# Patient Record
Sex: MALE | Race: WHITE
[De-identification: names, ages, dates, MRNs, and addresses within clinical notes are randomized per-mention and may not be internally consistent; named-entity substitution may affect disease eponyms.]

---

## 2017-03-03 ENCOUNTER — HOSPITAL ENCOUNTER (OUTPATIENT)
Dept: HOSPITAL 80 - BMCIMAGING | Age: 72
End: 2017-03-03
Attending: INTERNAL MEDICINE
Payer: COMMERCIAL

## 2017-03-03 DIAGNOSIS — R07.89: Primary | ICD-10-CM

## 2018-01-23 ENCOUNTER — HOSPITAL ENCOUNTER (OUTPATIENT)
Dept: HOSPITAL 80 - FIMAGING | Age: 73
End: 2018-01-23
Attending: PHYSICAL MEDICINE & REHABILITATION
Payer: COMMERCIAL

## 2018-01-23 DIAGNOSIS — M51.36: ICD-10-CM

## 2018-01-23 DIAGNOSIS — M53.2X6: Primary | ICD-10-CM

## 2018-05-04 ENCOUNTER — HOSPITAL ENCOUNTER (OUTPATIENT)
Dept: HOSPITAL 80 - FIMAGING | Age: 73
End: 2018-05-04
Attending: PHYSICAL MEDICINE & REHABILITATION
Payer: COMMERCIAL

## 2018-05-04 DIAGNOSIS — M25.551: Primary | ICD-10-CM

## 2018-05-04 DIAGNOSIS — M51.36: ICD-10-CM

## 2018-05-14 ENCOUNTER — HOSPITAL ENCOUNTER (OUTPATIENT)
Dept: HOSPITAL 80 - FIMAGING | Age: 73
End: 2018-05-14
Attending: INTERNAL MEDICINE
Payer: COMMERCIAL

## 2018-05-14 DIAGNOSIS — K40.90: ICD-10-CM

## 2018-05-14 DIAGNOSIS — K80.20: Primary | ICD-10-CM

## 2018-05-14 DIAGNOSIS — K57.30: ICD-10-CM

## 2018-05-14 DIAGNOSIS — J98.4: ICD-10-CM

## 2018-05-14 DIAGNOSIS — I70.0: ICD-10-CM

## 2018-05-14 PROCEDURE — 74177 CT ABD & PELVIS W/CONTRAST: CPT

## 2018-06-22 ENCOUNTER — HOSPITAL ENCOUNTER (OUTPATIENT)
Dept: HOSPITAL 80 - CIMAGING | Age: 73
End: 2018-06-22
Attending: INTERNAL MEDICINE
Payer: COMMERCIAL

## 2018-06-22 DIAGNOSIS — K80.20: Primary | ICD-10-CM

## 2018-10-22 ENCOUNTER — HOSPITAL ENCOUNTER (OUTPATIENT)
Dept: HOSPITAL 80 - FIMAGING | Age: 73
End: 2018-10-22
Attending: SURGERY
Payer: COMMERCIAL

## 2018-10-22 DIAGNOSIS — K63.9: Primary | ICD-10-CM

## 2018-10-22 PROCEDURE — 74177 CT ABD & PELVIS W/CONTRAST: CPT

## 2018-10-29 ENCOUNTER — HOSPITAL ENCOUNTER (INPATIENT)
Dept: HOSPITAL 80 - F3N | Age: 73
LOS: 2 days | Discharge: HOME | DRG: 331 | End: 2018-10-31
Attending: SURGERY | Admitting: SURGERY
Payer: COMMERCIAL

## 2018-10-29 DIAGNOSIS — Z23: ICD-10-CM

## 2018-10-29 DIAGNOSIS — C18.9: Primary | ICD-10-CM

## 2018-10-29 DIAGNOSIS — E11.9: ICD-10-CM

## 2018-10-29 DIAGNOSIS — Z53.31: ICD-10-CM

## 2018-10-29 LAB — PLATELET # BLD: 276 10^3/UL (ref 150–400)

## 2018-10-29 PROCEDURE — 0DTL0ZZ RESECTION OF TRANSVERSE COLON, OPEN APPROACH: ICD-10-PCS | Performed by: SURGERY

## 2018-10-29 PROCEDURE — G0008 ADMIN INFLUENZA VIRUS VAC: HCPCS

## 2018-10-29 PROCEDURE — 8E0W0CZ ROBOTIC ASSISTED PROCEDURE OF TRUNK REGION, OPEN APPROACH: ICD-10-PCS | Performed by: SURGERY

## 2018-10-29 PROCEDURE — G0009 ADMIN PNEUMOCOCCAL VACCINE: HCPCS

## 2018-10-29 RX ADMIN — INSULIN GLARGINE SCH UNITS: 100 INJECTION, SOLUTION SUBCUTANEOUS at 20:41

## 2018-10-29 RX ADMIN — GATIFLOXACIN SCH: 5 SOLUTION/ DROPS OPHTHALMIC at 15:41

## 2018-10-29 RX ADMIN — CEFOXITIN SCH MLS: 1 INJECTION, POWDER, FOR SOLUTION INTRAVENOUS at 13:15

## 2018-10-29 RX ADMIN — GATIFLOXACIN SCH: 5 SOLUTION/ DROPS OPHTHALMIC at 21:59

## 2018-10-29 RX ADMIN — CEFOXITIN SCH MLS: 1 INJECTION, POWDER, FOR SOLUTION INTRAVENOUS at 17:21

## 2018-10-29 RX ADMIN — OXYCODONE HYDROCHLORIDE AND ACETAMINOPHEN PRN TAB: 5; 325 TABLET ORAL at 15:54

## 2018-10-29 RX ADMIN — CEFOXITIN ONE MLS: 2 INJECTION, POWDER, FOR SOLUTION INTRAVENOUS at 08:04

## 2018-10-29 RX ADMIN — CEFOXITIN ONE MLS: 2 INJECTION, POWDER, FOR SOLUTION INTRAVENOUS at 09:38

## 2018-10-29 RX ADMIN — PREDNISOLONE ACETATE SCH: 10 SUSPENSION/ DROPS OPHTHALMIC at 15:41

## 2018-10-29 RX ADMIN — OXYCODONE HYDROCHLORIDE AND ACETAMINOPHEN PRN TAB: 5; 325 TABLET ORAL at 21:56

## 2018-10-29 RX ADMIN — KETOROLAC TROMETHAMINE SCH: 5 SOLUTION OPHTHALMIC at 21:58

## 2018-10-29 RX ADMIN — KETOROLAC TROMETHAMINE SCH: 5 SOLUTION OPHTHALMIC at 15:41

## 2018-10-29 RX ADMIN — PREDNISOLONE ACETATE SCH: 10 SUSPENSION/ DROPS OPHTHALMIC at 21:58

## 2018-10-29 NOTE — POSTOPPROG
Post Op Note


Date of Operation: 10/29/18


Surgeon: Ross Pop


Assistant: Dr. Reis


Anesthesiologist: Dr. Verdugo


Anesthesia: GET(General Endotracheal)


Pre-op Diagnosis: Colon cancer


Post-op Diagnosis: same


Procedure: Robotic Saravanan, transverse colectomy


Inf/Abcess present in the surg proc area at time of surgery?: No


EBL:

## 2018-10-29 NOTE — PDMN
Medical Necessity


Medical necessity: 74 yo sp CPT 61011, lap partial colectomy, MCG S235 Bowel 

Surgery: Colectomy, Partial, with or without Ostomy, by Laparoscopy, MC IP only

## 2018-10-29 NOTE — PDANEPAE
ANE History of Present Illness





ascending colon resection, DaVinci assist





ANE Past Medical History





- Cardiovascular History


Hx Hypertension: No


Hx Arrhythmias: No


Hx Chest Pain: No


Hx Coronary Artery / Peripheral Vascular Disease: No


Hx CHF / Valvular Disease: No


Hx Palpitations: No


Cardiovascular History Comment: SINUS ROSANNA





- Pulmonary History


Hx COPD: No


Hx Asthma/Reactive Airway Disease: No


Hx Recent Upper Respiratory Infection: No


Hx Oxygen in Use at Home: No


Hx Sleep Apnea: No


Sleep Apnea Screening Result - Last Documented: Negative





- Neurologic History


Hx Cerebrovascular Accident: No


Hx Seizures: No


Hx Dementia: No





- Endocrine History


Hx Diabetes: Yes


Endocrine History Comment: IDDM SINCE 2005





- Renal History


Hx Renal Disorders: No





- Liver History


Hx Hepatic Disorders: Yes


Hepatic History Comment: GALL STONES





- Neurological & Psychiatric Hx


Hx Neurological and Psychiatric Disorders: No





- Cancer History


Hx Cancer: Yes


Cancer History Comment: NEW DX COLON





- Congenital Disorder History


Hx Congenital Disorders: No





- GI History


Hx Gastrointestinal Disorders: No


Gastrointestinal History Comment: INTERMITTENT ABD SWELLING





- Other Health History


Other Health History: RECENT CATARACT SURGERY.  SCOLIOSIS





- Chronic Pain History


Chronic Pain: Yes (ABD)





- Surgical History


Prior Surgeries: COLONOSCOPY 10/18.  TEJINDER CATARACTS.  LAMINECTOMY 2012.  

CERVICAL  2000.  APPENDECTOMY.  RT ING HERNIA





ANE Review of Systems


Review of systems is: negative


Review of Systems: 








- Exercise capacity


METS (RN): 4 METS





ANE Patient History





- Allergies


Allergies/Adverse Reactions: 








adhesive Allergy (Unknown, Verified 03/06/15 19:42)


 Unknown








- Home Medications


Home medications: home medication list seen and reviewed


Home Medications: 








Aspirin [Aspirin 81mg (*)] 81 mg PO DAILY 03/06/15 [Last Taken 10/23/18]


Atorvastatin Calcium [Lipitor 40 mg (*)] 40 mg PO DAILY 03/06/15 [Last Taken 10/

28/18]


Ibuprofen [Motrin (*)] 400 - 600 mg PO BID PRN 03/06/15 [Last Taken Unknown]


Insulin Glargine [Lantus 100 UNITS/ML] 31 units SC HS 03/06/15 [Last Taken 10/27

/18]


Metformin HCl [Metformin 1000 mg] 1,000 mg PO BIDMEAL 03/06/15 [Last Taken 10/28

/18]


Tears/Hypromellose [Natural Balance] 1 - 2 drops EACHEYE PRN PRN 03/06/15 [Last 

Taken 10/28/18]


Gatifloxacin 0.5% [Zymaxid 0.5%] 1 drop EACHEYE TID 10/19/18 [Last Taken 10/28/

18]


Ketorolac 0.5% [Acular 0.5% Opht Drops (*)] 1 drops EACHEYE QID 10/19/18 [Last 

Taken 10/28/18]


prednisoLONE ACET 1% [Pred Forte 1% (*)] 1 drops EACHEYE QID 10/19/18 [Last 

Taken 10/28/18]








- NPO status


NPO Status: no food or drink >8 hours


NPO Since - Liquids (Date): 10/28/18


NPO Since - Liquids (Time): 21:00


NPO Since - Solids (Date): 10/28/18


NPO Since - Solids (Time): 16:00





- Anes Hx


Anes Hx: no prior problems





- Smoking Hx


Smoking Status: Unknown if ever smoked





- Family Anes Hx


Family Anes Hx: none





ANE Labs/Vital Signs





- Vital Signs


Vital Signs: reviewed preoperatively; see RN documention for details


Blood Pressure: 126/76


Heart Rate: 55


Respiratory Rate: 16


O2 Sat (%): 94


Height: 190.5 cm


Weight: 90.718 kg





ANE Physical Exam





- Airway


Neck exam: FROM


Mallampati Score: Class 2


Mouth exam: normal dental/mouth exam





- Pulmonary


Pulmonary: no respiratory distress





- Cardiovascular


Cardiovascular: regular rate and rhythym





- ASA Status


ASA Status: II





ANE Anesthesia Plan


Anesthesia Plan: general endotracheal anesthesia

## 2018-10-30 LAB — PLATELET # BLD: 244 10^3/UL (ref 150–400)

## 2018-10-30 RX ADMIN — GATIFLOXACIN SCH DROP: 5 SOLUTION/ DROPS OPHTHALMIC at 17:59

## 2018-10-30 RX ADMIN — PREDNISOLONE ACETATE SCH DROP: 10 SUSPENSION/ DROPS OPHTHALMIC at 18:00

## 2018-10-30 RX ADMIN — CEFOXITIN SCH MLS: 1 INJECTION, POWDER, FOR SOLUTION INTRAVENOUS at 00:12

## 2018-10-30 RX ADMIN — KETOROLAC TROMETHAMINE SCH DROP: 5 SOLUTION OPHTHALMIC at 18:00

## 2018-10-30 RX ADMIN — CEFOXITIN SCH MLS: 1 INJECTION, POWDER, FOR SOLUTION INTRAVENOUS at 05:28

## 2018-10-30 RX ADMIN — GATIFLOXACIN SCH DROP: 5 SOLUTION/ DROPS OPHTHALMIC at 09:59

## 2018-10-30 RX ADMIN — KETOROLAC TROMETHAMINE SCH: 5 SOLUTION OPHTHALMIC at 05:51

## 2018-10-30 RX ADMIN — KETOROLAC TROMETHAMINE SCH MG: 15 INJECTION, SOLUTION INTRAMUSCULAR; INTRAVENOUS at 17:59

## 2018-10-30 RX ADMIN — KETOROLAC TROMETHAMINE SCH MG: 15 INJECTION, SOLUTION INTRAMUSCULAR; INTRAVENOUS at 23:27

## 2018-10-30 RX ADMIN — GATIFLOXACIN SCH DROP: 5 SOLUTION/ DROPS OPHTHALMIC at 16:05

## 2018-10-30 RX ADMIN — INSULIN GLARGINE SCH UNITS: 100 INJECTION, SOLUTION SUBCUTANEOUS at 20:08

## 2018-10-30 RX ADMIN — DOCUSATE SODIUM AND SENNOSIDES SCH TAB: 50; 8.6 TABLET ORAL at 20:09

## 2018-10-30 RX ADMIN — OXYCODONE HYDROCHLORIDE AND ACETAMINOPHEN PRN TAB: 5; 325 TABLET ORAL at 16:03

## 2018-10-30 RX ADMIN — PREDNISOLONE ACETATE SCH: 10 SUSPENSION/ DROPS OPHTHALMIC at 05:51

## 2018-10-30 RX ADMIN — POLYETHYLENE GLYCOL 3350 PRN GM: 17 POWDER, FOR SOLUTION ORAL at 13:40

## 2018-10-30 RX ADMIN — OXYCODONE HYDROCHLORIDE AND ACETAMINOPHEN PRN TAB: 5; 325 TABLET ORAL at 10:12

## 2018-10-30 RX ADMIN — KETOROLAC TROMETHAMINE SCH MG: 15 INJECTION, SOLUTION INTRAMUSCULAR; INTRAVENOUS at 12:30

## 2018-10-30 NOTE — ASMTCMCOM
CM Note

 

CM Note                       

Notes:

Pt is a 74 y/o man admitted for a scheduled surgery. Pt had a mass removed from his colon. Pt will 

most likely d/c independent when medically stable. No therapies ordered at this time. CM available 

for changes.







Plan: Independent w/ supportive wife

 

Date Signed:  10/30/2018 12:15 PM

Electronically Signed By:SULEIMAN Castorena

## 2018-10-30 NOTE — GOP
DATE OF OPERATION:  10/29/2018



SURGEON:  Vance Pop MD



ASSISTANT:  Glen Reis MD, whose presence was requested by me and medically necessary for the s
afe and timely completion of this case.



ANESTHESIA:  General endotracheal.



ANESTHESIOLOGIST:  Herber France MD



PREOPERATIVE DIAGNOSIS:  Colon cancer.



POSTOPERATIVE DIAGNOSIS:  Colon cancer with multiple adhesions.



PROCEDURE PERFORMED:  

1.  Laparoscopic lysis of adhesions.

2.  Laparoscopic splenic flexure mobilization.

3.  Laparoscopic transverse colectomy, robotic assisted.



FINDINGS:  Patient had multiple adhesions throughout the right lower quadrant and right upper quadran
t.  No abdominal hernia was identified.  No evidence of extracolonic cancer was identified.





ESTIMATED BLOOD LOSS:  50 cc.



INDICATIONS:  This is a 73-year-old male with a history of a mass on colonoscopy.  Biopsy returned ca
ncer.  Risks and benefits of the procedure were discussed with the patient and his family, their ques
tions were answered and they wished to proceed.



DESCRIPTION OF PROCEDURE:  Patient was in the supine position.  After induction of adequate general e
ndotracheal anesthesia, the patient was prepped and draped in a standard surgical fashion.  0.5% Yung
amanda was injected at the infraumbilical area for local anesthesia.  An 8 mm incision made infraumbili
edel, and the abdominal wall was elevated.  The Veress needle was inserted, and after noting proper 
pressures, the abdomen was insufflated with carbon dioxide.  An 8 mm trocar was passed.  The camera f
ollowed.  There was no apparent damage from trocar placement.  There were multiple adhesions, especia
lly in the right lower and right upper quadrants.  Two more 8 mm ports were placed in the lower abdom
en.  These were both placed under direct vision after injecting 0.5% Marcaine for local anesthesia. 



The extensive adhesions were lysed.  The abdomen was carefully evaluated and no defect was identified
.  The colon was inspected throughout its length.  A small amount of ink was identified in the center
 of the transverse colon.  The transverse colon was carefully mobilized.  There were again multiple a
dhesions in the right upper quadrant necessitating a prolonged dissection; however, the colon was ful
ly mobilized including the splenic flexure.  The decision was made to perform a mini laparotomy at [facility] point. 



The robot was undocked.  The colon was grasped with an atraumatic locking grasper.  A small midline l
aparotomy incision was made after injecting 0.5% Marcaine.  This was carried down the subcutaneous ti
ssues using Bovie cautery and blunt dissection.  The fascia was divided in the midline.  The abdomen 
was again entered.  A wound protector was placed.  The bowel delivered into the incision and palpated
.  The mass was easily identified directly adjacent to the tattooing.  No other lesions were noted.  
The sites for transection getting adequate margins were cleared off using blunt dissection.  The AMAN 
stapler was passed and fired in each case.  The mesentery was then serially clamped, divided, and lig
ated with 3-0 Vicryl ties down to the base of the mesentery.  The specimen was sent with a suture mar
nataly the proximal extent. 



The decision was made to perform a hand-sewn anastomosis.  A posterior row of 3-0 Vicryl sutures was 
placed in an interrupted fashion.  The ends were tagged.  The staple lines were then resected sharply
 and sent for permanent section.  Prep was excellent and the colon was viable.  Two separate sutures 
of 3-0 Vicryl were used to run the interior layer.  Once this was completed, the anterior completion 
layer of Lembert suture was placed using 3-0 Vicryl.  The lumen was palpated and widely patent.  The 
colon was viable.  No twisting was identified.  This was then dropped back in the abdomen and the are
a inspected.  It was thoroughly irrigated and aspirated.  The wound protector was removed and clean c
losure protocol was initiated. 



The fascia at the mini laparotomy site was closed with 0 PDS in a running fashion.  Wounds were thoro
ughly irrigated and the skin at all sites was closed with 4-0 Monocryl in a subcuticular stitch.  Wou
nds were dressed with Dermabond.  The patient was then extubated and taken to the PACU in stable cond
ition.



COMPLICATIONS:  None.



DRAINS:  None.





Job #:  [id number]

## 2018-10-31 VITALS — SYSTOLIC BLOOD PRESSURE: 125 MMHG | DIASTOLIC BLOOD PRESSURE: 71 MMHG

## 2018-10-31 RX ADMIN — GATIFLOXACIN SCH DROP: 5 SOLUTION/ DROPS OPHTHALMIC at 07:39

## 2018-10-31 RX ADMIN — OXYCODONE HYDROCHLORIDE AND ACETAMINOPHEN PRN TAB: 5; 325 TABLET ORAL at 02:15

## 2018-10-31 RX ADMIN — POLYETHYLENE GLYCOL 3350 PRN GM: 17 POWDER, FOR SOLUTION ORAL at 05:16

## 2018-10-31 RX ADMIN — OXYCODONE HYDROCHLORIDE AND ACETAMINOPHEN PRN TAB: 5; 325 TABLET ORAL at 09:08

## 2018-10-31 RX ADMIN — KETOROLAC TROMETHAMINE SCH DROP: 5 SOLUTION OPHTHALMIC at 07:40

## 2018-10-31 RX ADMIN — KETOROLAC TROMETHAMINE SCH MG: 15 INJECTION, SOLUTION INTRAMUSCULAR; INTRAVENOUS at 12:03

## 2018-10-31 RX ADMIN — KETOROLAC TROMETHAMINE SCH MG: 15 INJECTION, SOLUTION INTRAMUSCULAR; INTRAVENOUS at 05:14

## 2018-10-31 RX ADMIN — PREDNISOLONE ACETATE SCH DROP: 10 SUSPENSION/ DROPS OPHTHALMIC at 07:39

## 2018-10-31 RX ADMIN — DOCUSATE SODIUM AND SENNOSIDES SCH TAB: 50; 8.6 TABLET ORAL at 07:32

## 2018-10-31 NOTE — SOAPPROG
SOAP Progress Note


Assessment/Plan: 


Assessment:


s/p lap transverse colectomy, improving. Plan d/c today, discussed restrictions/

diet.  Questions answered.























Plan:





10/30/18 10:00





10/31/18 10:38





Subjective: 





Patient without complaints, mandi po.  + BM, no blood.  Ambulating, voiding.


Objective: 





 Vital Signs











Temp Pulse Resp BP Pulse Ox


 


 36.8 C   60   16   136/89 H  94 


 


 10/31/18 08:00  10/31/18 08:00  10/31/18 08:00  10/31/18 08:00  10/31/18 08:00








 Laboratory Results





 10/30/18 04:20 





 10/30/18 04:20 





 











 10/30/18 10/31/18 11/01/18





 05:59 05:59 05:59


 


Intake Total 1900 1800 


 


Output Total 2525 1200 150


 


Balance -625 600 -150








Alert, NAD


RRR


Abd soft, NTTP


Inc C/D/I





ICD10 Worksheet


Patient Problems: 


 Problems











Problem Status Onset


 


Syncope Acute

## 2019-04-22 ENCOUNTER — HOSPITAL ENCOUNTER (EMERGENCY)
Dept: HOSPITAL 80 - FED | Age: 74
Discharge: HOME | End: 2019-04-22
Payer: COMMERCIAL

## 2019-04-22 VITALS — SYSTOLIC BLOOD PRESSURE: 175 MMHG | DIASTOLIC BLOOD PRESSURE: 79 MMHG

## 2019-04-22 DIAGNOSIS — F41.9: Primary | ICD-10-CM

## 2019-04-22 DIAGNOSIS — Z79.4: ICD-10-CM

## 2019-04-22 DIAGNOSIS — E11.9: ICD-10-CM

## 2019-04-22 DIAGNOSIS — R10.11: ICD-10-CM

## 2019-04-22 NOTE — EDPHY
H & P


Stated Complaint: ruq abd  "swelling" intermittent over last 2 yrs has seen 

multiple docs


Time Seen by Provider: 04/22/19 07:18


HPI/ROS: 





CHIEF COMPLAINT:  Abdominal swelling





HISTORY OF PRESENT ILLNESS:  The patient is a 73-year-old man who complains of 

intermittent right upper quadrant and flank and rib swelling over the last 2 

years.  He states that he has seen multiple doctors for this and had multiple 

workups.  Most recently had a CT scan in October and had laparoscopic 

exploratory surgery in October for possible hernia.  No abnormalities were 

found.  He does have a history of colon cancer with partial colectomy as well 

as appendectomy.  He states that this morning he felt like his right upper 

quadrant was swollen compared to normal so he came to the emergency department 

to try and get a CT scan while it is still swollen.  No nausea vomiting.  No 

pain.  No diarrhea.  No fever.


Severity:  Moderate


Modifying factors:  None





REVIEW OF SYSTEMS:


Constitutional:  denies: chills, fever, recent illness, recent injury


EENTM: denies: blurred vision, double vision, nose congestion


Respiratory: denies: cough, shortness of breath


Cardiac: denies: chest pain, irregular heart rate, lightheadedness, palpitations


Gastrointestinal/Abdominal:  See HPI


Genitourinary: denies: dysuria, frequency, hematuria, pain


Musculoskeletal: denies: joint pain, muscle pain


Skin: denies: lesions, rash, jaundice, bruising


Neurological: denies: headache, numbness, paresthesia, tingling, dizziness, 

weakness


Hematologic/Lymphatic: denies: blood clots, easy bleeding, easy bruising


Immunologic/allergic: denies: HIV/AIDS, transplant


 10 systems reviewed and negative except as noted





EXAM:


GENERAL:  Well-appearing, well-nourished and in no acute distress.


HEAD:  Atraumatic, normocephalic.


EYES:  Pupils equal round and reactive to light, extraocular movements intact, 

sclera anicteric, conjunctiva are normal.


ENT:  TMs normal, nares patent, oropharynx clear without exudates.  Moist 

mucous membranes.


NECK:  Normal range of motion, supple without lymphadenopathy or JVD.


LUNGS:  Breath sounds clear to auscultation bilaterally and equal.  No wheezes 

rales or rhonchi.


HEART:  Regular rate and rhythm without murmurs, rubs or gallops.


ABDOMEN:  Soft, nontender, normoactive bowel sounds.  No guarding, no rebound.  

No masses appreciated.  No visible swelling or hernia.


BACK:  No CVA tenderness, no spinal tenderness, step-offs or deformities


EXTREMITIES:  Normal range of motion, no pitting or edema.  No clubbing or 

cyanosis.


NEUROLOGICAL:  Cranial nerves II through XII grossly intact.  Normal speech, 

normal gait.  5/5 strength, normal movement in all extremities, normal sensation

, normal reflexes


PSYCH:  Normal mood, normal affect.


SKIN:  Warm, dry, normal turgor, no visible rashes or lesions.








Source: Patient


Exam Limitations: No limitations





- Personal History


Current Tetanus Diphtheria and Acellular Pertussis (TDAP): Yes





- Medical/Surgical History


Hx Asthma: No


Hx Chronic Respiratory Disease: No


Hx Diabetes: Yes


Hx Cardiac Disease: No


Hx Renal Disease: No


Hx Cirrhosis: No


Hx Alcoholism: No


Hx HIV/AIDS: No


Hx Splenectomy or Spleen Trauma: No


Other PMH: DMII, C-SPINE FUSION, LUMBAR SPINAL L3L4L5 surgery, HERNIA REPAIR, 

APPY, colon cancer with partial colectomy





- Family History


Significant Family History: No pertinent family hx





- Social History


Smoking Status: Current every day smoker


Alcohol Use: None


Constitutional: 


 Initial Vital Signs











Temperature (C)  36.8 C   04/22/19 07:03


 


Heart Rate  60   04/22/19 07:03


 


Respiratory Rate  18   04/22/19 07:03


 


Blood Pressure  148/82 H  04/22/19 07:03


 


O2 Sat (%)  98   04/22/19 07:03








 











O2 Delivery Mode               Room Air














Allergies/Adverse Reactions: 


 





adhesive Allergy (Unknown, Verified 04/22/19 07:02)


 Unknown








Home Medications: 














 Medication  Instructions  Recorded


 


Aspirin [Aspirin 81mg (*)] 81 mg PO DAILY 03/06/15


 


Atorvastatin Calcium [Lipitor 40 40 mg PO DAILY 03/06/15





mg (*)]  


 


Ibuprofen [Motrin (*)] 400 - 600 mg PO BID PRN 03/06/15


 


Insulin Glargine [Lantus 100 31 units SC HS 03/06/15





UNITS/ML]  


 


Metformin HCl [Metformin 1000 mg] 1,000 mg PO BIDMEAL 03/06/15


 


Tears/Hypromellose [Natural 1 - 2 drops EACHEYE PRN PRN 03/06/15





Balance]  


 


Gatifloxacin 0.5% [Zymaxid 0.5%] 1 drop EACHEYE TID 10/19/18


 


Ketorolac 0.5% [Acular 0.5% Opht 1 drops EACHEYE QID 10/19/18





Drops (*)]  


 


prednisoLONE ACET 1% [Pred Forte 1 drops EACHEYE QID 10/19/18





1% (*)]  














Medical Decision Making


ED Course/Re-evaluation: 





The patient does not have any swelling/deformity or abnormalities on exam.  His 

abdomen and ribs are completely symmetric.  He still seems to think that there 

may be some slight swelling.  We discussed his multiple previous workups 

including exploratory surgery.  I do not feel that there is anything to 

contribute here in the emergency department.  He understands and will follow up 

with his primary Dr. Ochoa.


Differential Diagnosis: 





Partial list of the Differential diagnosis considered include but were not 

limited to;  hernia, gas, anxiety and although unlikely based on the history 

and physical exam, I also considered aneurysm, fracture, infection.  





Departure





- Departure


Disposition: Home, Routine, Self-Care


Clinical Impression: 


 Anxiety about health





Condition: Fair


Instructions:  Gas and Bloating (ED)


Referrals: 


Colby Ochoa MD [Primary Care Provider] - 2-3 days, call for appt.

## 2021-03-31 NOTE — SOAPPROG
SOAP Progress Note


Assessment/Plan: 


Assessment:


s/p lap transverse colectomy, improving.  Bowel regimen, toradol, advance diet.

  























Plan:





10/30/18 10:00





Subjective: 





Patient c/o incisional pain, has not asked for pain meds.  Minimal nausea, no 

V.  Reggie clears without difficulty.  + flatus, no BM.  Ambulating, voiding.


Objective: 





 Vital Signs











Temp Pulse Resp BP Pulse Ox


 


 36.8 C   54 L  16   137/68 H  93 


 


 10/30/18 07:52  10/30/18 07:52  10/30/18 07:52  10/30/18 07:52  10/30/18 07:52








 Laboratory Results





 10/30/18 04:20 





 10/30/18 04:20 





 











 10/29/18 10/30/18 10/31/18





 05:59 05:59 05:59


 


Intake Total  1900 800


 


Output Total  2525 200


 


Balance  -625 600








Alert, NAD


RRR


Abd soft, inc TTP


Inc C/D/I





ICD10 Worksheet


Patient Problems: 


 Problems











Problem Status Onset


 


Syncope Acute Hemigard Intro: Due to skin fragility and wound tension, it was decided to use HEMIGARD adhesive retention suture devices to permit a linear closure. The skin was cleaned and dried for a 6cm distance away from the wound. Excessive hair, if present, was removed to allow for adhesion.